# Patient Record
Sex: MALE | Race: BLACK OR AFRICAN AMERICAN | NOT HISPANIC OR LATINO | URBAN - METROPOLITAN AREA
[De-identification: names, ages, dates, MRNs, and addresses within clinical notes are randomized per-mention and may not be internally consistent; named-entity substitution may affect disease eponyms.]

---

## 2018-11-18 ENCOUNTER — INPATIENT (INPATIENT)
Facility: HOSPITAL | Age: 48
LOS: 1 days | Discharge: HOME | DRG: 103 | End: 2018-11-20
Attending: INTERNAL MEDICINE | Admitting: HOSPITALIST
Payer: COMMERCIAL

## 2018-11-18 VITALS
RESPIRATION RATE: 18 BRPM | DIASTOLIC BLOOD PRESSURE: 82 MMHG | TEMPERATURE: 98 F | SYSTOLIC BLOOD PRESSURE: 122 MMHG | HEART RATE: 88 BPM | WEIGHT: 195.11 LBS | HEIGHT: 70 IN | OXYGEN SATURATION: 97 %

## 2018-11-18 LAB
APTT BLD: 34.5 SEC — SIGNIFICANT CHANGE UP (ref 27.5–36.3)
BASOPHILS # BLD AUTO: 0 K/UL — SIGNIFICANT CHANGE UP (ref 0–0.2)
BASOPHILS NFR BLD AUTO: 0.4 % — SIGNIFICANT CHANGE UP (ref 0–2)
EOSINOPHIL # BLD AUTO: 0.1 K/UL — SIGNIFICANT CHANGE UP (ref 0–0.5)
EOSINOPHIL NFR BLD AUTO: 1.7 % — SIGNIFICANT CHANGE UP (ref 0–5)
HCT VFR BLD CALC: 39.1 % — LOW (ref 42–52)
HGB BLD-MCNC: 12.7 G/DL — LOW (ref 14–18)
INR BLD: 0.92 RATIO — SIGNIFICANT CHANGE UP (ref 0.88–1.16)
LACTATE BLDV-MCNC: 1.3 MMOL/L — SIGNIFICANT CHANGE UP (ref 0.5–2)
LYMPHOCYTES # BLD AUTO: 2.9 K/UL — SIGNIFICANT CHANGE UP (ref 1–4.8)
LYMPHOCYTES # BLD AUTO: 54.1 % — SIGNIFICANT CHANGE UP (ref 20–55)
MCHC RBC-ENTMCNC: 31.7 PG — HIGH (ref 27–31)
MCHC RBC-ENTMCNC: 32.5 G/DL — SIGNIFICANT CHANGE UP (ref 32–36)
MCV RBC AUTO: 97.5 FL — HIGH (ref 80–94)
MONOCYTES # BLD AUTO: 0.5 K/UL — SIGNIFICANT CHANGE UP (ref 0–0.8)
MONOCYTES NFR BLD AUTO: 10.2 % — HIGH (ref 3–10)
NEUTROPHILS # BLD AUTO: 1.8 K/UL — SIGNIFICANT CHANGE UP (ref 1.8–8)
NEUTROPHILS NFR BLD AUTO: 33.6 % — LOW (ref 37–73)
PLATELET # BLD AUTO: 264 K/UL — SIGNIFICANT CHANGE UP (ref 150–400)
PROTHROM AB SERPL-ACNC: 10.6 SEC — SIGNIFICANT CHANGE UP (ref 10–12.9)
RBC # BLD: 4.01 M/UL — LOW (ref 4.6–6.2)
RBC # FLD: 13.1 % — SIGNIFICANT CHANGE UP (ref 11–15.6)
WBC # BLD: 5.3 K/UL — SIGNIFICANT CHANGE UP (ref 4.8–10.8)
WBC # FLD AUTO: 5.3 K/UL — SIGNIFICANT CHANGE UP (ref 4.8–10.8)

## 2018-11-18 PROCEDURE — 93010 ELECTROCARDIOGRAM REPORT: CPT

## 2018-11-18 PROCEDURE — 99285 EMERGENCY DEPT VISIT HI MDM: CPT | Mod: 25

## 2018-11-18 RX ORDER — ONDANSETRON 8 MG/1
4 TABLET, FILM COATED ORAL ONCE
Qty: 0 | Refills: 0 | Status: COMPLETED | OUTPATIENT
Start: 2018-11-18 | End: 2018-11-18

## 2018-11-18 RX ORDER — MORPHINE SULFATE 50 MG/1
6 CAPSULE, EXTENDED RELEASE ORAL ONCE
Qty: 0 | Refills: 0 | Status: DISCONTINUED | OUTPATIENT
Start: 2018-11-18 | End: 2018-11-18

## 2018-11-18 RX ADMIN — ONDANSETRON 4 MILLIGRAM(S): 8 TABLET, FILM COATED ORAL at 23:36

## 2018-11-18 RX ADMIN — MORPHINE SULFATE 6 MILLIGRAM(S): 50 CAPSULE, EXTENDED RELEASE ORAL at 23:37

## 2018-11-18 NOTE — ED ADULT NURSE NOTE - OBJECTIVE STATEMENT
52yo male c/o abdominal pain, n/v/d, headache, body ache and chills since friday. pt denies any sob, chest pain, numbness/tingling, change in vision. pt states he took tylenol around noon with no relief. decreased po intake and generalized fatigue.

## 2018-11-19 DIAGNOSIS — R07.9 CHEST PAIN, UNSPECIFIED: ICD-10-CM

## 2018-11-19 DIAGNOSIS — R10.13 EPIGASTRIC PAIN: ICD-10-CM

## 2018-11-19 DIAGNOSIS — R51 HEADACHE: ICD-10-CM

## 2018-11-19 LAB
ALBUMIN SERPL ELPH-MCNC: 3.9 G/DL — SIGNIFICANT CHANGE UP (ref 3.3–5.2)
ALP SERPL-CCNC: 48 U/L — SIGNIFICANT CHANGE UP (ref 40–120)
ALT FLD-CCNC: 20 U/L — SIGNIFICANT CHANGE UP
ANION GAP SERPL CALC-SCNC: 13 MMOL/L — SIGNIFICANT CHANGE UP (ref 5–17)
AST SERPL-CCNC: 25 U/L — SIGNIFICANT CHANGE UP
BILIRUB SERPL-MCNC: 0.2 MG/DL — LOW (ref 0.4–2)
BLD GP AB SCN SERPL QL: SIGNIFICANT CHANGE UP
BUN SERPL-MCNC: 18 MG/DL — SIGNIFICANT CHANGE UP (ref 8–20)
CALCIUM SERPL-MCNC: 9.3 MG/DL — SIGNIFICANT CHANGE UP (ref 8.6–10.2)
CHLORIDE SERPL-SCNC: 102 MMOL/L — SIGNIFICANT CHANGE UP (ref 98–107)
CK MB CFR SERPL CALC: 2.4 NG/ML — SIGNIFICANT CHANGE UP (ref 0–6.7)
CK MB CFR SERPL CALC: 2.4 NG/ML — SIGNIFICANT CHANGE UP (ref 0–6.7)
CK SERPL-CCNC: 271 U/L — HIGH (ref 30–200)
CK SERPL-CCNC: 389 U/L — HIGH (ref 30–200)
CO2 SERPL-SCNC: 24 MMOL/L — SIGNIFICANT CHANGE UP (ref 22–29)
CREAT SERPL-MCNC: 1 MG/DL — SIGNIFICANT CHANGE UP (ref 0.5–1.3)
CRP SERPL-MCNC: <0.4 MG/DL — SIGNIFICANT CHANGE UP (ref 0–0.4)
CRP SERPL-MCNC: <0.4 MG/DL — SIGNIFICANT CHANGE UP (ref 0–0.4)
ERYTHROCYTE [SEDIMENTATION RATE] IN BLOOD: 16 MM/HR — SIGNIFICANT CHANGE UP (ref 0–20)
ERYTHROCYTE [SEDIMENTATION RATE] IN BLOOD: 8 MM/HR — SIGNIFICANT CHANGE UP (ref 0–20)
GLUCOSE SERPL-MCNC: 87 MG/DL — SIGNIFICANT CHANGE UP (ref 70–115)
POTASSIUM SERPL-MCNC: 3.9 MMOL/L — SIGNIFICANT CHANGE UP (ref 3.5–5.3)
POTASSIUM SERPL-SCNC: 3.9 MMOL/L — SIGNIFICANT CHANGE UP (ref 3.5–5.3)
PROT SERPL-MCNC: 7.1 G/DL — SIGNIFICANT CHANGE UP (ref 6.6–8.7)
SODIUM SERPL-SCNC: 139 MMOL/L — SIGNIFICANT CHANGE UP (ref 135–145)
TROPONIN T SERPL-MCNC: <0.01 NG/ML — SIGNIFICANT CHANGE UP (ref 0–0.06)
TYPE + AB SCN PNL BLD: SIGNIFICANT CHANGE UP

## 2018-11-19 PROCEDURE — 93010 ELECTROCARDIOGRAM REPORT: CPT

## 2018-11-19 PROCEDURE — 71275 CT ANGIOGRAPHY CHEST: CPT | Mod: 26

## 2018-11-19 PROCEDURE — 99234 HOSP IP/OBS SM DT SF/LOW 45: CPT

## 2018-11-19 PROCEDURE — 93458 L HRT ARTERY/VENTRICLE ANGIO: CPT | Mod: 26

## 2018-11-19 PROCEDURE — 99152 MOD SED SAME PHYS/QHP 5/>YRS: CPT

## 2018-11-19 PROCEDURE — 70450 CT HEAD/BRAIN W/O DYE: CPT | Mod: 26

## 2018-11-19 PROCEDURE — 99223 1ST HOSP IP/OBS HIGH 75: CPT

## 2018-11-19 PROCEDURE — 74174 CTA ABD&PLVS W/CONTRAST: CPT | Mod: 26

## 2018-11-19 PROCEDURE — 99255 IP/OBS CONSLTJ NEW/EST HI 80: CPT

## 2018-11-19 RX ORDER — MAGNESIUM SULFATE 500 MG/ML
2 VIAL (ML) INJECTION ONCE
Qty: 0 | Refills: 0 | Status: COMPLETED | OUTPATIENT
Start: 2018-11-19 | End: 2018-11-19

## 2018-11-19 RX ORDER — MORPHINE SULFATE 50 MG/1
4 CAPSULE, EXTENDED RELEASE ORAL ONCE
Qty: 0 | Refills: 0 | Status: DISCONTINUED | OUTPATIENT
Start: 2018-11-19 | End: 2018-11-19

## 2018-11-19 RX ORDER — ACETAMINOPHEN 500 MG
1000 TABLET ORAL ONCE
Qty: 0 | Refills: 0 | Status: COMPLETED | OUTPATIENT
Start: 2018-11-19 | End: 2018-11-19

## 2018-11-19 RX ORDER — ENOXAPARIN SODIUM 100 MG/ML
40 INJECTION SUBCUTANEOUS DAILY
Qty: 0 | Refills: 0 | Status: DISCONTINUED | OUTPATIENT
Start: 2018-11-19 | End: 2018-11-20

## 2018-11-19 RX ORDER — SUCRALFATE 1 G
10 TABLET ORAL
Qty: 200 | Refills: 0 | OUTPATIENT
Start: 2018-11-19 | End: 2018-11-23

## 2018-11-19 RX ORDER — OMEPRAZOLE 10 MG/1
1 CAPSULE, DELAYED RELEASE ORAL
Qty: 14 | Refills: 0 | OUTPATIENT
Start: 2018-11-19 | End: 2018-12-02

## 2018-11-19 RX ORDER — METOCLOPRAMIDE HCL 10 MG
10 TABLET ORAL ONCE
Qty: 0 | Refills: 0 | Status: COMPLETED | OUTPATIENT
Start: 2018-11-19 | End: 2018-11-19

## 2018-11-19 RX ORDER — MAGNESIUM SULFATE 500 MG/ML
2 VIAL (ML) INJECTION ONCE
Qty: 0 | Refills: 0 | Status: DISCONTINUED | OUTPATIENT
Start: 2018-11-19 | End: 2018-11-19

## 2018-11-19 RX ORDER — METOCLOPRAMIDE HCL 10 MG
10 TABLET ORAL ONCE
Qty: 0 | Refills: 0 | Status: DISCONTINUED | OUTPATIENT
Start: 2018-11-19 | End: 2018-11-19

## 2018-11-19 RX ORDER — ACETAMINOPHEN 500 MG
650 TABLET ORAL EVERY 6 HOURS
Qty: 0 | Refills: 0 | Status: DISCONTINUED | OUTPATIENT
Start: 2018-11-19 | End: 2018-11-20

## 2018-11-19 RX ORDER — ASPIRIN/CALCIUM CARB/MAGNESIUM 324 MG
324 TABLET ORAL DAILY
Qty: 0 | Refills: 0 | Status: DISCONTINUED | OUTPATIENT
Start: 2018-11-19 | End: 2018-11-20

## 2018-11-19 RX ADMIN — Medication 50 GRAM(S): at 03:22

## 2018-11-19 RX ADMIN — Medication 2 TABLET(S): at 13:40

## 2018-11-19 RX ADMIN — MORPHINE SULFATE 4 MILLIGRAM(S): 50 CAPSULE, EXTENDED RELEASE ORAL at 01:40

## 2018-11-19 RX ADMIN — Medication 650 MILLIGRAM(S): at 19:37

## 2018-11-19 RX ADMIN — Medication 2 TABLET(S): at 12:19

## 2018-11-19 RX ADMIN — MORPHINE SULFATE 4 MILLIGRAM(S): 50 CAPSULE, EXTENDED RELEASE ORAL at 01:23

## 2018-11-19 RX ADMIN — MORPHINE SULFATE 6 MILLIGRAM(S): 50 CAPSULE, EXTENDED RELEASE ORAL at 01:10

## 2018-11-19 RX ADMIN — MORPHINE SULFATE 4 MILLIGRAM(S): 50 CAPSULE, EXTENDED RELEASE ORAL at 22:47

## 2018-11-19 RX ADMIN — Medication 2 GRAM(S): at 04:30

## 2018-11-19 RX ADMIN — Medication 324 MILLIGRAM(S): at 11:40

## 2018-11-19 RX ADMIN — Medication 650 MILLIGRAM(S): at 18:39

## 2018-11-19 RX ADMIN — Medication 1000 MILLIGRAM(S): at 17:09

## 2018-11-19 RX ADMIN — MORPHINE SULFATE 4 MILLIGRAM(S): 50 CAPSULE, EXTENDED RELEASE ORAL at 23:15

## 2018-11-19 RX ADMIN — Medication 104 MILLIGRAM(S): at 03:18

## 2018-11-19 RX ADMIN — Medication 10 MILLIGRAM(S): at 04:00

## 2018-11-19 RX ADMIN — Medication 400 MILLIGRAM(S): at 16:45

## 2018-11-19 NOTE — ED PROVIDER NOTE - MEDICAL DECISION MAKING DETAILS
PT with epigastric pain that radiating and sever will evaluate for cardiac vs GI in nature and revaluate.

## 2018-11-19 NOTE — DISCHARGE NOTE ADULT - SECONDARY DIAGNOSIS.
Abnormal echocardiogram Chronic gastritis, presence of bleeding unspecified, unspecified gastritis type Dilated pancreatic duct

## 2018-11-19 NOTE — PROGRESS NOTE ADULT - SUBJECTIVE AND OBJECTIVE BOX
51y Male who had a [facility] no intervention, via RRA radial band in place. Patient awake and alert without complaints. Denies chest pain, sob, palps,      Neuro: A&OX3  Lungs: CTA B/L  CV: S1, S2, no murmur, RRR  Abd: Soft  Right Wrist no bleeding, no hematoma, no ecchymosis  Extremity: + distal pulses, cap refill <3 sec      A/P: 51y Male s/p [facility] no intervention  1. Wrist management discussed with patient  2. Continue current meds  3. Follow up as an outpatient with cardiologist  4. Bedrest x 2 hours  5. Follow up wrist check in am

## 2018-11-19 NOTE — ED PROVIDER NOTE - OBJECTIVE STATEMENT
PT with no SPMHx presents to the ED with complaint of 2 days of progressively worsting abd pain. PT states he had a sudden onset of sever abd pain he describes it as sever pressure, that starts in his epigastric area radiating to the Lt side of his neck and arm with associated LT arm tingling. PT states that they have not done anything for the pain prior to coming to the ED. PT admits to months of HA that has been acutely worse over the last 2 days as well, pt denies fever, chills, weakness, back pain, change in urination, change in bowel movement

## 2018-11-19 NOTE — DISCHARGE NOTE ADULT - CARE PROVIDER_API CALL
Erick Franz), Gastroenterology; Internal Medicine  [address] [facility]  [address]  [place] [address]  Phone: [phone]  Fax: [phone]    Maximo High (MD; PhD), Neurology; Vascular Neurology  [address] [facility] [address]  [place] [address]  Phone: [phone]  Fax: [phone]    Justice Knapp), Cardiology; Internal Medicine  [address] [id number]  Phone: [phone]  Fax: [phone]

## 2018-11-19 NOTE — DISCHARGE NOTE ADULT - CARE PROVIDERS DIRECT ADDRESSES
[email or web address]

## 2018-11-19 NOTE — ED CDU PROVIDER INITIAL DAY NOTE - NS ED ROS FT
ROS: CONTUSIONAL: Denies fever, chills, fatigue, wt loss. HEAD: Denies trauma, HA, Dizziness. EYE: Denies Acute visual changes, diplopia. ENMT: Denies change in hearing, tinnitus, epistaxis, difficulty swallowing, sore throat. CARDIO: Denies palpitations, edema. RESP: Denies Cough, SOB , Diff breathing, hemoptysis. GI: Denies N/V, ABD pain, change in bowel movement. URINARY: Denies difficulty urinating, pelvic pain. MS:  Denies joint pain, back pain, weakness, decreased ROM, swelling. NEURO: Denies change in gait, seizures, loss of sensation, dizziness, confusion LOC.  PSY: NO SI/HI.

## 2018-11-19 NOTE — DISCHARGE NOTE ADULT - MEDICATION SUMMARY - MEDICATIONS TO TAKE
I will START or STAY ON the medications listed below when I get home from the hospital:    butalbital/acetaminophen/caffeine 50 mg-325 mg-40 mg oral tablet  -- 1 tab(s) by mouth 2 times a day, As Needed -headache - for headache MDD:2 tabs   -- Indication: For migraines    pantoprazole 40 mg oral delayed release tablet  -- 1 tab(s) by mouth once a day   -- Indication: For gastritis

## 2018-11-19 NOTE — CONSULT NOTE ADULT - ATTENDING COMMENTS
Epigastric pain, tenderness on percussion.   Telemetry monitor- ST elevation. ECG : normal  Symptoms: Non cardiac. Elevated CPK.  Echo: wall motion abnormality and Non compaction findings.   repeat echo with contrast.   cardiac catherization.   LV gram with cath and Cardiac MRI as outpatient for non compaction

## 2018-11-19 NOTE — ED CDU PROVIDER INITIAL DAY NOTE - DETAILS
PT with non specific chest pain that radiates to LT arm and neck will obs for repeat labs and EKG, Echo in the am and card consult. Pt informed of plan and agrees to Observation.

## 2018-11-19 NOTE — CONSULT NOTE ADULT - ASSESSMENT
52 y/o male presents with epigastric pain and headache with associated paresthesias. Imaging studies negative for any acute pathology. EKG with minimal diffuse ST elevations, likely normal variant.

## 2018-11-19 NOTE — DISCHARGE NOTE ADULT - PLAN OF CARE
Restricted use with no heavy lifting of affected arm for 48 hours.  No submerging the arm in water for 48 hours.  You may start showering today.  Call your doctor for any bleeding, swelling, loss of sensation in the hand or fingers, or fingers turning blue.  If heavy bleeding or large lumps form, hold pressure at the spot and come to the Emergency Room. prevention take fioricet or excedrin as needed  follow up with neurology in 1-2 weeks. Restricted use with no heavy lifting of affected arm for 48 hours.  No submerging the arm in water for 48 hours.  You may start showering today.  Call your doctor for any bleeding, swelling, loss of sensation in the hand or fingers, or fingers turning blue. If heavy bleeding or large lumps form, hold pressure at the spot and come to the Emergency Room.  follow up with cardiology in 2-3 weeks trial of protonix. can use as needed tums or maalox  follow up with gastroenterology in 2-3 weeks. outpatient GI follow up.
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
## 2018-11-19 NOTE — H&P ADULT - HISTORY OF PRESENT ILLNESS
52 y/o male with hx of pancreatitis came to the ED complaining of 2 days of progressively worsening abdominal pain.  Pain is located in the epigastric area, sudden onset, sharp in nature no radiation. Patient noted associated left arm numbness, nausea and 1 episode of vomiting. No alleviating or exacerbating factor. He also noted HA that has been going on for months and has recently worsen. He has no fever, chills, shortness of breath, palpitation, change in bowel/urinary habit.   Patient initially placed in observation to rule out ACS, 3 sets of troponin negative. TTE done which showed wall motion abnormality which warrant admission.

## 2018-11-19 NOTE — ED ADULT NURSE REASSESSMENT NOTE - NSIMPLEMENTINTERV_GEN_ALL_ED
Implemented All Universal Safety Interventions:  [place] to call system. Call bell, personal items and telephone within reach. Instruct patient to call for assistance. Room bathroom lighting operational. Non-slip footwear when patient is off stretcher. Physically safe environment: no spills, clutter or unnecessary equipment. Stretcher in lowest position, wheels locked, appropriate side rails in place.

## 2018-11-19 NOTE — ED ADULT NURSE REASSESSMENT NOTE - NS ED NURSE REASSESS COMMENT FT1
Cardiology NP at bedside.
pt a&ox3, ambulating without difficulty. denies any further pain/discomfort at this time. pt on obs, awaiting further testing. pt updated on plan of care, verbalizes understanding. call bell in reach
repeat labs sent,
pt states came to ED for abdominal pain, which has now subsided. pt reports HA and dizziness persist. pt has been having headaches since friday and dizziness since this morning. wife is at bedside at this time.

## 2018-11-19 NOTE — ED PROVIDER NOTE - PROGRESS NOTE DETAILS
PT seen resting comfortably in no acute distress PT with continued pain, PT informed of all results, PT with non specific EKG changes and radiation of pain to LT arm and neck pt will be placed on obs for eval by cardio and echo in the morning pt PT verbalizes that he understands all instructions and results and the need to be held.

## 2018-11-19 NOTE — DISCHARGE NOTE ADULT - PATIENT PORTAL LINK FT
You can access the [email or web address] Patient Portal, offered by [facility], by registering with the following website: [email or web address]

## 2018-11-19 NOTE — DISCHARGE NOTE ADULT - HOSPITAL COURSE
50 yo M presents with worsening headaches (chronic) consistent with migraines and epigastric pain with history of hiatal hernia repair in 2000.  ACS ruled out, seen by cardiology and underwent negative cardiac catheterization due to abnormal echo (wall motion abnormalities).   Fioricet helped with headaches, ct head negative and neurology eval advised as outpatient.  CT abdomen negative except for dilated pancreatic duct to 5mm but lipase negative.  patient likely has component of gastritis/esophagitis and PPI therapy initiated with referral to GI.    stable for discharge home.   dc planning 35 minutes.  vital stable no acute complaints aside from temporal headache b/l. rrr s1s2 ctab soft abdomen no LE edema nonfocal neuro.

## 2018-11-19 NOTE — ED CDU PROVIDER DISPOSITION NOTE - CLINICAL COURSE
Pt stable throughout ED stay and had 3 negative troponins, however found to have several abnormalities on ECHo - pt admitted for cardiac catheterization

## 2018-11-19 NOTE — H&P ADULT - ASSESSMENT
52 y/o male with hx of pancreatitis came to the ED complaining of 2 days of progressively worsening abdominal pain.  Pain is located in the epigastric area, sudden onset, sharp in nature no radiation. Patient noted associated left arm numbness, nausea and 1 episode of vomiting. No alleviating or exacerbating factor. He also noted HA that has been going on for months and has recently worsen. He has no fever, chills, shortness of breath, palpitation, change in bowel/urinary habit.   Patient initially placed in observation to rule out ACS, 3 sets of troponin negative. TTE done which showed wall motion abnormality which warrant admission.    Epigastric pain likely cardiology etiology  Admit to telemetry   Abnormal TTE   Troponin x3 negative  Aspirin 325mg once given   TTE: abnormal wall motion   Patient scheduled for cardiac cath today   Cardiology on board  Lipase normal   Tylenol 650mg q6h PRN for pain     Headache  Tylenol 650mg q6h PRN for pain     Supportive   DVT prophylaxis: Lovenox 40mg daily  GI prophylaxis: not indicated  Diet: DASH/TLC

## 2018-11-19 NOTE — DISCHARGE NOTE ADULT - CARE PLAN
Principal Discharge DX:	Epigastric pain  Assessment and plan of treatment:	Restricted use with no heavy lifting of affected arm for 48 hours.  No submerging the arm in water for 48 hours.  You may start showering today.  Call your doctor for any bleeding, swelling, loss of sensation in the hand or fingers, or fingers turning blue.  If heavy bleeding or large lumps form, hold pressure at the spot and come to the Emergency Room. Principal Discharge DX:	Migraine without status migrainosus, not intractable, unspecified migraine type  Goal:	prevention  Assessment and plan of treatment:	take fioricet or excedrin as needed  follow up with neurology in 1-2 weeks.  Secondary Diagnosis:	Abnormal echocardiogram  Assessment and plan of treatment:	Restricted use with no heavy lifting of affected arm for 48 hours.  No submerging the arm in water for 48 hours.  You may start showering today.  Call your doctor for any bleeding, swelling, loss of sensation in the hand or fingers, or fingers turning blue. If heavy bleeding or large lumps form, hold pressure at the spot and come to the Emergency Room.  follow up with cardiology in 2-3 weeks  Secondary Diagnosis:	Chronic gastritis, presence of bleeding unspecified, unspecified gastritis type  Assessment and plan of treatment:	trial of protonix. can use as needed tums or maalox  follow up with gastroenterology in 2-3 weeks.  Secondary Diagnosis:	Dilated pancreatic duct  Assessment and plan of treatment:	outpatient GI follow up.

## 2018-11-20 VITALS — SYSTOLIC BLOOD PRESSURE: 116 MMHG | DIASTOLIC BLOOD PRESSURE: 68 MMHG | HEART RATE: 84 BPM

## 2018-11-20 PROCEDURE — G0378: CPT

## 2018-11-20 PROCEDURE — 99152 MOD SED SAME PHYS/QHP 5/>YRS: CPT

## 2018-11-20 PROCEDURE — 82550 ASSAY OF CK (CPK): CPT

## 2018-11-20 PROCEDURE — 85652 RBC SED RATE AUTOMATED: CPT

## 2018-11-20 PROCEDURE — 71275 CT ANGIOGRAPHY CHEST: CPT

## 2018-11-20 PROCEDURE — 99285 EMERGENCY DEPT VISIT HI MDM: CPT | Mod: 25

## 2018-11-20 PROCEDURE — 86900 BLOOD TYPING SEROLOGIC ABO: CPT

## 2018-11-20 PROCEDURE — C1887: CPT

## 2018-11-20 PROCEDURE — C1769: CPT

## 2018-11-20 PROCEDURE — 99153 MOD SED SAME PHYS/QHP EA: CPT

## 2018-11-20 PROCEDURE — 96365 THER/PROPH/DIAG IV INF INIT: CPT

## 2018-11-20 PROCEDURE — 70450 CT HEAD/BRAIN W/O DYE: CPT

## 2018-11-20 PROCEDURE — C1894: CPT

## 2018-11-20 PROCEDURE — 85027 COMPLETE CBC AUTOMATED: CPT

## 2018-11-20 PROCEDURE — 36415 COLL VENOUS BLD VENIPUNCTURE: CPT

## 2018-11-20 PROCEDURE — 96375 TX/PRO/DX INJ NEW DRUG ADDON: CPT

## 2018-11-20 PROCEDURE — 80053 COMPREHEN METABOLIC PANEL: CPT

## 2018-11-20 PROCEDURE — 85730 THROMBOPLASTIN TIME PARTIAL: CPT

## 2018-11-20 PROCEDURE — 99239 HOSP IP/OBS DSCHRG MGMT >30: CPT

## 2018-11-20 PROCEDURE — 82553 CREATINE MB FRACTION: CPT

## 2018-11-20 PROCEDURE — 93458 L HRT ARTERY/VENTRICLE ANGIO: CPT

## 2018-11-20 PROCEDURE — 84484 ASSAY OF TROPONIN QUANT: CPT

## 2018-11-20 PROCEDURE — 93306 TTE W/DOPPLER COMPLETE: CPT

## 2018-11-20 PROCEDURE — 93005 ELECTROCARDIOGRAM TRACING: CPT

## 2018-11-20 PROCEDURE — 83690 ASSAY OF LIPASE: CPT

## 2018-11-20 PROCEDURE — 83605 ASSAY OF LACTIC ACID: CPT

## 2018-11-20 PROCEDURE — 96368 THER/DIAG CONCURRENT INF: CPT

## 2018-11-20 PROCEDURE — 86901 BLOOD TYPING SEROLOGIC RH(D): CPT

## 2018-11-20 PROCEDURE — 86850 RBC ANTIBODY SCREEN: CPT

## 2018-11-20 PROCEDURE — 86140 C-REACTIVE PROTEIN: CPT

## 2018-11-20 PROCEDURE — 85610 PROTHROMBIN TIME: CPT

## 2018-11-20 PROCEDURE — 74174 CTA ABD&PLVS W/CONTRAST: CPT

## 2018-11-20 PROCEDURE — 96376 TX/PRO/DX INJ SAME DRUG ADON: CPT

## 2018-11-20 RX ORDER — PANTOPRAZOLE SODIUM 20 MG/1
40 TABLET, DELAYED RELEASE ORAL
Qty: 0 | Refills: 0 | Status: DISCONTINUED | OUTPATIENT
Start: 2018-11-20 | End: 2018-11-20

## 2018-11-20 RX ORDER — PANTOPRAZOLE SODIUM 20 MG/1
1 TABLET, DELAYED RELEASE ORAL
Qty: 30 | Refills: 0 | OUTPATIENT
Start: 2018-11-20 | End: 2018-12-19

## 2018-11-20 RX ADMIN — Medication 650 MILLIGRAM(S): at 07:18

## 2018-11-20 RX ADMIN — ENOXAPARIN SODIUM 40 MILLIGRAM(S): 100 INJECTION SUBCUTANEOUS at 10:54

## 2018-11-20 RX ADMIN — Medication 1 TABLET(S): at 10:53

## 2018-11-20 NOTE — PROGRESS NOTE ADULT - SUBJECTIVE AND OBJECTIVE BOX
SUBJECTIVE:  Cardiology NP F/U note:  SP: [facility] which revealed: no intervention official pending  denies complaints of chest pain/sob/dizziness/palps overnight   pamela diet / ambulating.       	  MEDICATIONS:  acetaminophen   Tablet .. 650 milliGRAM(s) Oral every 6 hours PRN  acetaminophen 325 mG/butalbital 50 mG/caffeine 40 mG 1 Tablet(s) Oral three times a day PRN  aluminum hydroxide/magnesium hydroxide/simethicone Suspension 30 milliLiter(s) Oral every 6 hours PRN  pantoprazole    Tablet 40 milliGRAM(s) Oral two times a day  aspirin  chewable 324 milliGRAM(s) Oral daily  enoxaparin Injectable 40 milliGRAM(s) SubCutaneous daily        PHYSICAL EXAM:    T(C): 36.8 (18 @ 10:10), Max: 36.8 (18 @ 10:10)  HR: 84 (18 @ 12:20) (56 - 84)  BP: 116/68 (18 @ 12:20) (105/75 - 170/70)  RR: 18 (18 @ 10:10) (15 - 18)  SpO2: 98% (18 @ 06:24) (96% - 100%)  Wt(kg): --    I&O's Summary    2018 07:01  -  2018 12:33  --------------------------------------------------------  IN: 360 mL / OUT: 0 mL / NET: 360 mL        Daily     Daily Weight in k.3 (2018 05:45)    Appearance: Normal	  HEENT:   Normal oral mucosa,  Lymphatic: No lymphadenopathy  Cardiovascular: Normal S1 S2,RRR  No JVD, No murmurs, No edema  Respiratory: Lungs clear to auscultation	  Psychiatry: A & O x 3, Mood & affect appropriate  Gastrointestinal:  Soft, Non-tender, + BS	  Skin: No rashes, No ecchymoses, No cyanosis  Neurologic: Non-focal  Extremities: Normal range of motion, No clubbing, cyanosis or edema Right radial no hematoma  dressing removed.   Vascular: Peripheral pulses palpable 2+ bilaterally    TELEMETRY:  RSR 70 no events on tele     ECG:  	  RADIOLOGY:   DIAGNOSTIC TESTING:  [X ] Echocardiogram:  < from: TTE Echo Complete w/Doppler (18 @ 09:51) >  The left atrium is normal in size.   2. There is mild concentric left ventricular hypertrophy.   3. Prominent trebeculations in the LV and RV apex. Subtle segmental wall   motion abnormalities in LV apex. Findings may suggest non-compaction.   Suggest cardiac MRI to confirm this finding if clinically indicated.   4. Left ventricular ejection fraction, by visual estimation, is 45 to   50%. Grade II diastolic dysfunction.   5. The right atrium is normal in size.   6. Normal right ventricular size and function.   7. Mild tricuspid regurgitation.   8. There is no evidence of pericardial effusion.      < end of copied text >    [ ]  Catheterization: official pending   [ ] Stress Test:    OTHER: 	    LABS:	 	    CARDIAC MARKERS:                                  12.7   5.3   )-----------( 264      ( 2018 23:47 )             39.1         139  |  102  |  18.0  ----------------------------<  87  3.9   |  24.0  |  1.00    Ca    9.3      2018 23:47    TPro  7.1  /  Alb  3.9  /  TBili  0.2<L>  /  DBili  x   /  AST  25  /  ALT  20  /  AlkPhos  48        ASSESSMENT:  50 y/o male with hx of pancreatitis came to the ED complaining of 2 days of progressively worsening abdominal pain.  Pain is located in the epigastric area, sudden onset, sharp in nature no radiation. Patient noted associated left arm numbness, nausea and 1 episode of vomiting. No alleviating or exacerbating factor. He also noted HA that has been going on for months   SP [facility] 18 : No intervention . offical pending   stable overnight     Plan:  stable for d/c home today.   / radial care and follow up discussed.

## 2018-11-21 PROBLEM — Z00.00 ENCOUNTER FOR PREVENTIVE HEALTH EXAMINATION: Status: ACTIVE | Noted: 2018-11-21

## 2020-01-22 NOTE — ED CDU PROVIDER INITIAL DAY NOTE - MEDICAL DECISION MAKING DETAILS
Admission Reconciliation is Completed  Discharge Reconciliation is Not Complete Admission Reconciliation is Completed  Discharge Reconciliation is Completed PT with epigastric pain that radiating and sever will evaluate for cardiac vs GI in nature and revaluate.

## 2020-11-09 NOTE — PATIENT PROFILE ADULT - NSALCOHOLTYPE_GEN_A_NUR
This is the Subsequent Medicare Annual Wellness Exam, performed 12 months or more after the Initial AWV or the last Subsequent AWV    I have reviewed the patient's medical history in detail and updated the computerized patient record. 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    Essential hypertension I10    ACP (advance care planning) Z71.89    Prostate cancer ([facility] C61     Past Medical History:   Diagnosis Date    AR (allergic rhinitis) 6/11/2010    Arthritis     CTS (carpal tunnel syndrome)     Hypertension     Panhypopituitarism ([facility]     Reactive airway disease 6/11/2010      Past Surgical History:   Procedure Laterality Date    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 90 Tab 3    levothyroxine (SYNTHROID) 112 mcg tablet Take 112 mcg by mouth.  testosterone cypionate (DEPOTESTOTERONE CYPIONATE) 200 mg/mL injection       desmopressin (DDAVP) 0.1 mg tablet Take 0.1 mg by mouth two (2) times a day.  levothyroxine (LEVOTHROID) 112 mcg tablet Take  by mouth Daily (before breakfast).  Syringe, Reusable, 3 mL SyrR 1 mL by Does Not Apply route as directed. 1ml IM every 2 weeks (23G 1 1/2) 6 Each 3    NAPROXEN (NAPROSYN PO) Take  by mouth.  hydrocortisone (CORTEF) 5 mg tablet Take 1 Tab by mouth two (2) times daily as needed.  30 Tab 0     Allergies   Allergen Reactions    Niaspan [Niacin] Anaphylaxis and Other (comments)     Tongue swelling, flushing and difficulty breathing    Keppra [Levetiracetam] Rash History reviewed. No pertinent family history. Social History     Tobacco Use    Smoking status: Former Smoker     Last attempt to quit: 1990     Years since quittin.4    Smokeless tobacco: Never Used   Substance Use Topics    Alcohol use: No       Depression Risk Factor Screening:     3 most recent PHQ Screens 2020   Little interest or pleasure in doing things Not at all   Feeling down, depressed, irritable, or hopeless Not at all   Total Score PHQ 2 0       Alcohol Risk Screen   Do you average more than 1 drink per night or more than 7 drinks a week: No    In the past three months have you have had more than 4 drinks containing alcohol on one occasion: No        Functional Ability and Level of Safety:   Hearing: Hearing is good. patient states he has documented hearing loss. Activities of Daily Living: The home contains: no safety equipment. patient states he did have a hand rail installed in the shower remodel. Patient does total self care     Ambulation: with no difficulty     Fall Risk:  Fall Risk Assessment, last 12 mths 2020   Able to walk? Yes   Fall in past 12 months? No     Abuse Screen:  Patient is not abused       Cognitive Screening   Has your family/caregiver stated any concerns about your memory: no     Cognitive Screening: Normal - . Patient Care Team   Patient Care Team:  Barbie Coles MD as PCP - Elizabeth Camacho MD as PCP - [address] [place] [address]  Empaneled Provider  Dr. Chin Smith as Physician (Neurosurgery)  Dr. Martha [place] as Physician (Neurosurgery)  Edwardll Leeanne, OD (Optometry)    Glaucoma Screening-  pt to f/u   Pneumonia Vaccine- UTD  Shingles Vaccine- needs Shingrinx  Tdap Vaccine- not UTD. Can get at pharmacy   Colonoscopy-  3/30/15 Dr Srinath Paredes diverticulosis  f/u in 3/2025  PSA- patient followed by urology at [facility] and is scheduled for biopsy  Advance Directive-  Has one.  Will give us copy  Assessment/Plan   Education and counseling provided:  Are appropriate based on today's review and evaluation  End-of-Life planning (with patient's consent)    Diagnoses and all orders for this visit:    1. Medicare annual wellness visit, subsequent    2. Essential hypertension  -     METABOLIC PANEL, COMPREHENSIVE; Future    3. Dyslipidemia  -     LIPID PANEL; Future    4. Panhypopituitarism ([facility]    5. Prostate cancer ([facility]    6. Iliac artery aneurysm ([facility]  -     [address] Maintenance Due   Topic Date Due    DTaP/Tdap/Td series (1 - Tdap) 07/28/1972    Shingrix Vaccine Age 50> (1 of 2) 07/28/2001    GLAUCOMA SCREENING Q2Y  04/05/2018     A comprehensive 5 year plan for medical care and screening exams was reviewed with pt and they received a copy of it. beer/wine/liquor

## 2022-01-28 NOTE — DISCHARGE NOTE ADULT - PRINCIPAL DIAGNOSIS
[facility]
                                    [address] [place]  [address]
_________________________________________________________________________________________
                                                                 Signed   
 
 
Sinus bradycardia with marked sinus arrhythmia with 1st degree AV block
Left ventricular hypertrophy with repolarization abnormality ( Garrick product )
T wave inversion in Inferior leads
Abnormal ECG
When compared with ECG of 23-NOV-2018 09:30,
FL interval has increased
Vent. rate has decreased BY  32 BPM
Confirmed by SONIA ESTEVEZ DO ([id number]) on 1/28/2022 1:32:19 PM
 
 
 
 
 
 
 
 
 
 
 
 
 
 
 
 
 
 
 
 
 
 
 
 
 
 
 
 
 
 
 
 
 
 
 
 
 
    Electronically Signed By: SONIA ESTEVEZ DO  01/28/22 1332
_________________________________________________________________________________________
PATIENT NAME:     EZRA JEFFERSON                      
MEDICAL RECORD #: [id number]                     Electrocardiogram             
          ACCT #: [id number]  
DATE OF BIRTH:   [birth year]                                       
PHYSICIAN:   SONIA ESTEVEZ DO                     REPORT #: [id number]
REPORT IS CONFIDENTIAL AND NOT TO BE RELEASED WITHOUT AUTHORIZATION Epigastric pain Migraine without status migrainosus, not intractable, unspecified migraine type

## 2022-05-04 NOTE — H&P ADULT - FAMILY HISTORY
Goal Outcome Evaluation:  Plan of Care Reviewed With: patient, son        Progress: improving  Outcome Evaluation: Amb 480 ft w/ CGA ( 2 stand.rests) & init holding IV pole w/ L hand, then recip armswing  w/ latter segment, + performed ther exer per HEP, but diffic w/ processing cues & retaining method for PLB, eliana. & recip armswing. Desat 95% on RA, & elev  w/ gt.   Father  Still living? No  Family history of premature CAD, Age at diagnosis: Age Unknown
